# Patient Record
Sex: MALE | Race: WHITE | HISPANIC OR LATINO | Employment: UNEMPLOYED | URBAN - METROPOLITAN AREA
[De-identification: names, ages, dates, MRNs, and addresses within clinical notes are randomized per-mention and may not be internally consistent; named-entity substitution may affect disease eponyms.]

---
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
## 2024-11-03 ENCOUNTER — HOSPITAL ENCOUNTER (EMERGENCY)
Facility: MEDICAL CENTER | Age: 24
End: 2024-11-03
Attending: EMERGENCY MEDICINE

## 2024-11-03 VITALS
SYSTOLIC BLOOD PRESSURE: 159 MMHG | DIASTOLIC BLOOD PRESSURE: 91 MMHG | WEIGHT: 314.6 LBS | HEART RATE: 96 BPM | OXYGEN SATURATION: 94 % | RESPIRATION RATE: 16 BRPM | BODY MASS INDEX: 46.46 KG/M2 | TEMPERATURE: 98.6 F

## 2024-11-03 DIAGNOSIS — F17.200 SMOKING: ICD-10-CM

## 2024-11-03 DIAGNOSIS — J40 BRONCHITIS: ICD-10-CM

## 2024-11-03 DIAGNOSIS — H66.001 NON-RECURRENT ACUTE SUPPURATIVE OTITIS MEDIA OF RIGHT EAR WITHOUT SPONTANEOUS RUPTURE OF TYMPANIC MEMBRANE: ICD-10-CM

## 2024-11-03 LAB
FLUAV RNA SPEC QL NAA+PROBE: NEGATIVE
FLUBV RNA SPEC QL NAA+PROBE: NEGATIVE
RSV RNA SPEC QL NAA+PROBE: NEGATIVE
SARS-COV-2 RNA RESP QL NAA+PROBE: NOTDETECTED

## 2024-11-03 PROCEDURE — 0241U HCHG SARS-COV-2 COVID-19 NFCT DS RESP RNA 4 TRGT ED POC: CPT

## 2024-11-03 PROCEDURE — 99283 EMERGENCY DEPT VISIT LOW MDM: CPT

## 2024-11-03 RX ORDER — AMOXICILLIN 500 MG/1
500 CAPSULE ORAL 3 TIMES DAILY
Qty: 30 CAPSULE | Refills: 0 | Status: ACTIVE | OUTPATIENT
Start: 2024-11-03

## 2024-11-03 RX ORDER — ALBUTEROL SULFATE 90 UG/1
2 INHALANT RESPIRATORY (INHALATION) EVERY 6 HOURS PRN
Qty: 8.5 G | Refills: 0 | Status: SHIPPED | OUTPATIENT
Start: 2024-11-03

## 2024-11-03 NOTE — ED TRIAGE NOTES
"Chief Complaint   Patient presents with    Shortness of Breath     PT reports coughing episodes that have occurred at night when he lies down and \"that it is hard for me to breathe\" x 3 days.  Pt reports hemoptysis at times.       Pt demonstrates barky cough while in triage.  Mask in place.  Covid test completed.  "

## 2024-11-03 NOTE — ED NOTES
Patient has been provided written and verbal education on bronchitis and ear infection. He verbalized understanding of how to obtained and take prescribed medications. Encourage to follow up with a primary care regarding elevated blood pressure.

## 2024-11-03 NOTE — ED PROVIDER NOTES
"ED Provider Note    CHIEF COMPLAINT  Chief Complaint   Patient presents with    Shortness of Breath     PT reports coughing episodes that have occurred at night when he lies down and \"that it is hard for me to breathe\" x 3 days.  Pt reports hemoptysis at times.         EXTERNAL RECORDS REVIEWED  Outpatient Notes patient was seen at [place] urgent [facility] 4/26/2020 for a left ankle sprain    HPI/ROS  LIMITATION TO HISTORY   Select: : None  OUTSIDE HISTORIAN(S):  none    Emeterio Chiu is a 24 y.o. male who presents waning of 3 to 4 days of coughing worse when he lays down at night with difficulty breathing.  He states that he has some slight hemoptysis in his sputum.  He denies any runny nose but does have some sinus congestion.  He also has some wheezing but no history of asthma.  He denies any fevers or chills.  He states he does smoke marijuana daily with a vape pens.  Nothing makes his symptoms better or worse.  He is not diabetic.    PAST MEDICAL HISTORY   has a past medical history of Bronchitis and Sinusitis.    SURGICAL HISTORY  patient denies any surgical history    FAMILY HISTORY  History reviewed. No pertinent family history.    SOCIAL HISTORY  Social History     Tobacco Use    Smoking status: Never    Smokeless tobacco: Never   Vaping Use    Vaping status: Never Used   Substance and Sexual Activity    Alcohol use: Yes     Comment: beer a day    Drug use: Yes     Types: Inhaled     Comment: marijuana    Sexual activity: Not on file       CURRENT MEDICATIONS  Home Medications       Reviewed by Cat Salas R.N. (Registered Nurse) on 11/03/24 at 1310  Med List Status: Not Addressed     Medication Last Dose Status        Patient Guzman Taking any Medications                           ALLERGIES  No Known Allergies    PHYSICAL EXAM  VITAL SIGNS: /72   Pulse (!) 108   Temp 36.4 °C (97.5 °F) (Oral)   Resp 20   Wt (!) 143 kg (314 lb 9.5 oz)   SpO2 94%   BMI 46.46 kg/m²      Constitutional: Well " developed, Well nourished, No acute distress, Non-toxic appearance.   HEENT: Normocephalic, Atraumatic,  external ears normal, pharynx erythematous,  Mucous  Membranes moist, positive rhinorrhea with mucosal edema right TM is erythematous bulging with loss of landmarks left TM is normal  Eyes: PERRL, EOMI, Conjunctiva normal, No discharge.   Neck: Normal range of motion, No tenderness, Supple, No stridor.   Lymphatic: No lymphadenopathy    Cardiovascular: Regular Rate and Rhythm, No murmurs,  rubs, or gallops.   Thorax & Lungs: Aturned wheezes especially in the right lung lower lung no tachypnea no rhonchi  Abdomen: Bowel sounds normal, Soft, non tender, non distended,  No pulsatile masses., no rebound guarding or peritoneal signs.   Skin: Warm, Dry, No erythema, No rash,   Back:  No CVA tenderness,  No spinal tenderness, bony crepitance step offs or instability.   Extremities: Equal, intact distal pulses, No cyanosis, clubbing or edema,  No tenderness.   Musculoskeletal: Good range of motion in all major joints. No tenderness to palpation or major deformities noted.   Neurologic: Alert & oriented No focal deficits noted.  Psychiatric: Affect normal, Judgment normal, Mood normal.      EKG/LABS  Results for orders placed or performed during the hospital encounter of 11/03/24   POC CoV-2, FLU A/B, RSV by PCR    Collection Time: 11/03/24  1:18 PM   Result Value Ref Range    POC Influenza A RNA, PCR Negative Negative    POC Influenza B RNA, PCR Negative Negative    POC RSV, by PCR Negative Negative    POC SARS-CoV-2, PCR NotDetected NotDetected       I have independently interpreted this EKG    RADIOLOGY/PROCEDURES   I have independently interpreted the diagnostic imaging associated with this visit and am waiting the final reading from the radiologist.   My preliminary interpretation is as follows: None    Radiologist interpretation:  No orders to display       COURSE & MEDICAL DECISION MAKING    ASSESSMENT, COURSE AND  PLAN  Care Narrative: Emeterio Chiu is a 24 y.o. male who presents waning of 3 to 4 days of coughing worse when he lays down at night with difficulty breathing.  He states that he has some slight hemoptysis in his sputum.  He denies any runny nose but does have some sinus congestion.  He also has some wheezing but no history of asthma.  He denies any fevers or chills.  He states he does smoke marijuana daily with a vape pens.  Nothing makes his symptoms better or worse.  He is not diabetic.  On physical exam he is alert awake in no acute distress she has some scattered expiratory wheezes in all lung fields heart is regular rhythm no murmurs rubs or gallops pharynx is mildly erythematous he has some rhinorrhea with mucosal edema right TM is erythematous bulging with loss of landmarks left TM is normal.  Speaking full sentences without any tachypnea.              ADDITIONAL PROBLEMS MANAGED      DISPOSITION AND DISCUSSIONS    Patient appears to have a right otitis media as well as bronchitis.  I will discharge him home with amoxicillin for his ear infection and albuterol for his bronchitis and wheezing.  I advised him strongly to stop vaping marijuana and instead use edibles or oils as they will not damage his lungs.  If he has any worsening cough shortness of breath or worsening symptoms he should return for recheck.  I did refer him back to his primary care physician for recheck.    COVID flu RSV is negative.  I have discussed management of the patient with the following physicians and JIGNA's:  none    Discussion of management with other QHP or appropriate source(s): None     Escalation of care considered, and ultimately not performed:diagnostic imaging x-ray was considered but the patient is afebrile and he already has a diagnosis of right otitis media which will require antibiotics is not hypoxic.    Barriers to care at this time, including but not limited to:  Smokes weed daily with a vape pen .     Decision  tools and prescription drugs considered including, but not limited to: Antibiotics amoxicillin and albuterol .      The patient will return for new or worsening symptoms and is stable at the time of discharge.    The patient is referred to a primary physician for blood pressure management, diabetic screening, and for all other preventative health concerns.        DISPOSITION:  Patient will be discharged home in stable condition.    FOLLOW UP:  LAUREN Srinivasan  [address]  [facility] [place] [facility] [id number] [phone]    Call   for recheck      OUTPATIENT MEDICATIONS:  Discharge Medication List as of 11/3/2024  1:44 PM        START taking these medications    Details   albuterol 108 (90 Base) MCG/ACT Aero Soln inhalation aerosol Inhale 2 Puffs every 6 hours as needed for Shortness of Breath., Disp-8.5 g, R-0, Normal      amoxicillin (AMOXIL) 500 MG Cap Take 1 Capsule by mouth 3 times a day., Disp-30 Capsule, R-0, Normal             FINAL DIAGNOSIS  1. Non-recurrent acute suppurative otitis media of right ear without spontaneous rupture of tympanic membrane    2. Bronchitis    3. Smoking         Electronically signed by: Kati Robin M.D., 11/3/2024 1:25 PM